# Patient Record
Sex: FEMALE | Race: WHITE | Employment: FULL TIME | URBAN - METROPOLITAN AREA
[De-identification: names, ages, dates, MRNs, and addresses within clinical notes are randomized per-mention and may not be internally consistent; named-entity substitution may affect disease eponyms.]

---

## 2017-07-14 LAB
HPV COMMENT: NORMAL
HPV TYPE 16: NOT DETECTED
HPV TYPE 18: NOT DETECTED
HPVOH (OTHER TYPES): NOT DETECTED

## 2017-11-22 ENCOUNTER — OFFICE VISIT (OUTPATIENT)
Dept: SURGERY | Age: 42
End: 2017-11-22

## 2017-11-22 VITALS
HEART RATE: 73 BPM | BODY MASS INDEX: 21.16 KG/M2 | SYSTOLIC BLOOD PRESSURE: 117 MMHG | TEMPERATURE: 98.2 F | DIASTOLIC BLOOD PRESSURE: 70 MMHG | HEIGHT: 65 IN | WEIGHT: 127 LBS

## 2017-11-22 DIAGNOSIS — N64.4 BREAST PAIN, LEFT: Primary | ICD-10-CM

## 2017-11-22 PROCEDURE — 99203 OFFICE O/P NEW LOW 30 MIN: CPT | Performed by: SURGERY

## 2017-11-22 RX ORDER — M-VIT,TX,IRON,MINS/CALC/FOLIC 27MG-0.4MG
1 TABLET ORAL DAILY
COMMUNITY

## 2017-11-22 RX ORDER — CEPHALEXIN 500 MG/1
500 CAPSULE ORAL
COMMUNITY
Start: 2017-11-14 | End: 2017-11-24

## 2017-11-22 ASSESSMENT — ENCOUNTER SYMPTOMS
RESPIRATORY NEGATIVE: 1
EYES NEGATIVE: 1
GASTROINTESTINAL NEGATIVE: 1

## 2017-11-22 NOTE — PROGRESS NOTES
supplementation  OCP not taking    Medications:  Medication documentation has been reviewed in the electronic medical record and patient office intake form. Exam:  /70 (Site: Right Arm, Position: Sitting, Cuff Size: Medium Adult)   Pulse 73   Temp 98.2 °F (36.8 °C) (Oral)   Ht 5' 5\" (1.651 m)   Wt 127 lb (57.6 kg)   LMP 10/22/2017   BMI 21.13 kg/m²      Physical Exam      Constitutional: She appears well-nourished. No apparent distress. Breast: The patient was examined in the upright and supine position. She has a \"B\" cup breast. Breasts are symmetric. Right: There were no discrete masses or changes in breast contour. There were no skin changes of the breast or nipple areolar complex. There was no nipple inversion or discharge. Left: There is no notable erythema or ballotable fluid collection on physical exam. In the location of her tenderness and there are no significant physical exam findings. Ultrasound performed in office failed to identify any fluid collection over the area of her tenderness. There were no discrete masses or changes in breast contour. There were no skin changes of the breast or nipple areolar complex. There was no nipple inversion or discharge. There is no axillary lymphadenopathy palpated bilaterally. Head: Normocephalic and atraumatic. Eyes: EOM are normal. Pupils are equal, round, and reactive to light. Neck: Neck supple. No tracheal deviation present. Cardiovascular: regular rate. Extremities appear well perfused. Pulmonary: respirations are non-labored and without audible distress  Lymphatics: no palpable axillary or cervical lymphadenopathy. Skin: No rash noted. No erythema. Neurologic: alert and oriented. Assessment/Plan: Ms. Denny Jovel is a 38y.o. year-old woman with left breast pain, acute onset      Left breast pain: Likely mild cellulitis which is improving. There are no concerning physical exam findings at this time.  There is no indication for aspiration or drainage procedure. She was placed on both Bactrim and Keflex. As Bactrim has reasonable coverage and is taken was times per day, we will have her continue on this. We'll plan to see her back in the surgical oncology office in 2-3 weeks to reevaluate. We describe signs and symptoms of worsening which she should notify our office about. She verbalizes understanding. Screening recommendations were reviewed. All of the patient's questions were answered at this time however, she was encouraged to call the office with any further inquiries. Approximately 30 minutes of time were spent in this visit of which 50% or more of the time was related to coordination of care.

## 2018-04-20 ENCOUNTER — HOSPITAL ENCOUNTER (OUTPATIENT)
Dept: SURGERY | Age: 43
Discharge: HOME | End: 2018-04-20
Attending: OBSTETRICS & GYNECOLOGY | Admitting: OBSTETRICS & GYNECOLOGY

## 2018-04-20 VITALS
SYSTOLIC BLOOD PRESSURE: 106 MMHG | BODY MASS INDEX: 20.03 KG/M2 | TEMPERATURE: 97.3 F | WEIGHT: 124.6 LBS | HEART RATE: 67 BPM | DIASTOLIC BLOOD PRESSURE: 63 MMHG | OXYGEN SATURATION: 100 % | HEIGHT: 66 IN | RESPIRATION RATE: 14 BRPM

## 2018-04-20 DIAGNOSIS — N92.0 MENORRHAGIA WITH REGULAR CYCLE: ICD-10-CM

## 2018-04-20 LAB — HCG(URINE) PREGNANCY TEST: NEGATIVE

## 2018-04-20 RX ORDER — LIDOCAINE HYDROCHLORIDE 10 MG/ML
0.5 INJECTION, SOLUTION EPIDURAL; INFILTRATION; INTRACAUDAL; PERINEURAL ONCE
Status: DISCONTINUED | OUTPATIENT
Start: 2018-04-20 | End: 2018-04-21 | Stop reason: HOSPADM

## 2018-04-20 RX ORDER — DIPHENHYDRAMINE HYDROCHLORIDE 50 MG/ML
12.5 INJECTION INTRAMUSCULAR; INTRAVENOUS
Status: ACTIVE | OUTPATIENT
Start: 2018-04-20 | End: 2018-04-20

## 2018-04-20 RX ORDER — ONDANSETRON 2 MG/ML
4 INJECTION INTRAMUSCULAR; INTRAVENOUS
Status: ACTIVE | OUTPATIENT
Start: 2018-04-20 | End: 2018-04-20

## 2018-04-20 RX ORDER — IBUPROFEN 600 MG/1
600 TABLET ORAL EVERY 6 HOURS PRN
Qty: 30 TABLET | Refills: 1 | Status: ON HOLD | OUTPATIENT
Start: 2018-04-20 | End: 2021-11-03 | Stop reason: HOSPADM

## 2018-04-20 RX ORDER — MEPERIDINE HYDROCHLORIDE 25 MG/ML
12.5 INJECTION INTRAMUSCULAR; INTRAVENOUS; SUBCUTANEOUS EVERY 5 MIN PRN
Status: DISCONTINUED | OUTPATIENT
Start: 2018-04-20 | End: 2018-04-21 | Stop reason: HOSPADM

## 2018-04-20 RX ORDER — OXYCODONE HYDROCHLORIDE AND ACETAMINOPHEN 5; 325 MG/1; MG/1
2 TABLET ORAL PRN
Status: ACTIVE | OUTPATIENT
Start: 2018-04-20 | End: 2018-04-20

## 2018-04-20 RX ORDER — OXYCODONE HYDROCHLORIDE AND ACETAMINOPHEN 5; 325 MG/1; MG/1
1 TABLET ORAL PRN
Status: ACTIVE | OUTPATIENT
Start: 2018-04-20 | End: 2018-04-20

## 2018-04-20 RX ORDER — LABETALOL HYDROCHLORIDE 5 MG/ML
5 INJECTION, SOLUTION INTRAVENOUS EVERY 10 MIN PRN
Status: DISCONTINUED | OUTPATIENT
Start: 2018-04-20 | End: 2018-04-21 | Stop reason: HOSPADM

## 2018-04-20 RX ORDER — ACETAMINOPHEN 160 MG/5ML
650 SOLUTION ORAL ONCE
Status: COMPLETED | OUTPATIENT
Start: 2018-04-20 | End: 2018-04-20

## 2018-04-20 RX ORDER — FENTANYL CITRATE 50 UG/ML
25 INJECTION, SOLUTION INTRAMUSCULAR; INTRAVENOUS EVERY 5 MIN PRN
Status: DISCONTINUED | OUTPATIENT
Start: 2018-04-20 | End: 2018-04-21 | Stop reason: HOSPADM

## 2018-04-20 RX ORDER — ACETAMINOPHEN 325 MG/1
TABLET ORAL
Status: DISCONTINUED
Start: 2018-04-20 | End: 2018-04-20 | Stop reason: WASHOUT

## 2018-04-20 RX ORDER — SODIUM CHLORIDE, SODIUM LACTATE, POTASSIUM CHLORIDE, CALCIUM CHLORIDE 600; 310; 30; 20 MG/100ML; MG/100ML; MG/100ML; MG/100ML
INJECTION, SOLUTION INTRAVENOUS CONTINUOUS
Status: DISCONTINUED | OUTPATIENT
Start: 2018-04-20 | End: 2018-04-21 | Stop reason: HOSPADM

## 2018-04-20 RX ORDER — IBUPROFEN 600 MG/1
600 TABLET ORAL EVERY 6 HOURS PRN
Qty: 30 TABLET | Refills: 1 | Status: SHIPPED | OUTPATIENT
Start: 2018-04-20 | End: 2021-10-13

## 2018-04-20 RX ORDER — HYDROMORPHONE HCL 110MG/55ML
0.5 PATIENT CONTROLLED ANALGESIA SYRINGE INTRAVENOUS EVERY 5 MIN PRN
Status: DISCONTINUED | OUTPATIENT
Start: 2018-04-20 | End: 2018-04-21 | Stop reason: HOSPADM

## 2018-04-20 RX ADMIN — FENTANYL CITRATE 25 MCG: 50 INJECTION, SOLUTION INTRAMUSCULAR; INTRAVENOUS at 09:25

## 2018-04-20 RX ADMIN — ACETAMINOPHEN 650 MG: 160 SOLUTION ORAL at 08:05

## 2018-04-20 RX ADMIN — SODIUM CHLORIDE, SODIUM LACTATE, POTASSIUM CHLORIDE, CALCIUM CHLORIDE: 600; 310; 30; 20 INJECTION, SOLUTION INTRAVENOUS at 07:34

## 2018-04-20 ASSESSMENT — PAIN SCALES - GENERAL
PAINLEVEL_OUTOF10: 5
PAINLEVEL_OUTOF10: 2

## 2018-04-20 ASSESSMENT — PAIN - FUNCTIONAL ASSESSMENT: PAIN_FUNCTIONAL_ASSESSMENT: 0-10

## 2021-10-13 RX ORDER — HYDROCODONE BITARTRATE AND ACETAMINOPHEN 5; 325 MG/1; MG/1
1 TABLET ORAL EVERY 6 HOURS PRN
Status: ON HOLD | COMMUNITY
End: 2021-11-03

## 2021-10-13 RX ORDER — LORATADINE 10 MG/1
10 CAPSULE, LIQUID FILLED ORAL DAILY PRN
COMMUNITY

## 2021-10-13 RX ORDER — CALCIUM CARBONATE 500(1250)
500 TABLET ORAL DAILY
COMMUNITY

## 2021-10-13 RX ORDER — MULTIVIT WITH MINERALS/LUTEIN
1000 TABLET ORAL DAILY
COMMUNITY

## 2021-10-13 NOTE — PROGRESS NOTES
Name_______________________________________Printed:____________________  Date and time of surgery__11/3/21_MAIN__0930___________________Arrival Time:__0800______________   1. The instructions given regarding when and if a patient needs to stop oral intake prior to surgery varies. Follow the specific instructions you were given                  ___Nothing to eat or to drink after Midnight the night before. __X__Carbo loading or ERAS instructions will be given to select patients-if you have been given those instructions -please do the following                           The evening before your surgery after dinner before midnight drink 40 ounces of gatorade. If you are diabetic use sugar free. The morning of surgery drink 40 ounces of water. This needs to be finished 3 hours prior to your surgery start time. 2. Take the following pills with a small sip of water on the morning of surgery___________None________________________________________                  Do not take blood pressure medications ending in pril or sartan the saundra prior to surgery or the morning of surgery_   3. Aspirin, Ibuprofen, Advil, Naproxen, Vitamin E and other Anti-inflammatory products and supplements should be stopped for 5 -7days before surgery or as directed by your physician. 4. Check with your Doctor regarding stopping Plavix, Coumadin,Eliquis, Lovenox,Effient,Pradaxa,Xarelto, Fragmin or other blood thinners and follow their instructions. 5. Do not smoke, and do not drink any alcoholic beverages 24 hours prior to surgery. This includes NA Beer. Refrain from the usage of any recreational drugs. 6. You may brush your teeth and gargle the morning of surgery. DO NOT SWALLOW WATER   7. You MUST make arrangements for a responsible adult to stay on site while you are here and take you home after your surgery. You will not be allowed to leave alone or drive yourself home.   It is strongly suggested someone stay with you the first 24 hrs. Your surgery will be cancelled if you do not have a ride home. 8. A parent/legal guardian must accompany a child scheduled for surgery and plan to stay at the hospital until the child is discharged. Please do not bring other children with you. 9. Please wear simple, loose fitting clothing to the hospital.  Uri Angulo not bring valuables (money, credit cards, checkbooks, etc.) Do not wear any makeup (including no eye makeup) or nail polish on your fingers or toes. 10. DO NOT wear any jewelry or piercings on day of surgery. All body piercing jewelry must be removed. 11. If you have ___dentures, they will be removed before going to the OR; we will provide you a container. If you wear ___contact lenses or ___glasses, they will be removed; please bring a case for them. 12. Please see your family doctor/pediatrician for a history & physical and/or concerning medications. Bring any test results/reports from your physician's office. PCP_______surgeon___________Phone___________H&P Appt. Date________             13 If you  have a Living Will and Durable Power of  for Healthcare, please bring in a copy. 15. Notify your Surgeon if you develop any illness between now and surgery  time, cough, cold, fever, sore throat, nausea, vomiting, etc.  Please notify your surgeon if you experience dizziness, shortness of breath or blurred vision between now & the time of your surgery             15. DO NOT shave your operative site 96 hours prior to surgery. For face & neck surgery, men may use an electric razor 48 hours prior to surgery. 16. Shower the night before or morning of surgery using an antibacterial soap or as you have been instructed. 17. To provide excellent care visitors will be limited to one in the room at any given time. 18.  Please bring picture ID and insurance card.              19.  Visit our web site for communication. If the ride is leaving the hospital grounds please make sure they are back in time for pickup. Have the patient inform the staff on arrival what their rides plans are while the patient is in the facility. At the MAIN there is one visitor allowed. Please note that the visitor policy is subject to change. All above information reviewed with patient in person or by phone. Patient verbalizes understanding. All questions and concerns addressed.                                                                                                  Patient/Rep____________________                                                                                                                                    PRE OP INSTRUCTIONS

## 2021-10-28 ENCOUNTER — HOSPITAL ENCOUNTER (OUTPATIENT)
Age: 46
Discharge: HOME | End: 2021-10-28
Payer: COMMERCIAL

## 2021-10-28 DIAGNOSIS — Z01.818 PREOP TESTING: ICD-10-CM

## 2021-10-28 LAB
A/G RATIO: 1.3 (ref 1.1–2.2)
ABO/RH: NORMAL
ALBUMIN SERPL-MCNC: 4.4 G/DL (ref 3.4–5)
ALP BLD-CCNC: 64 U/L (ref 40–129)
ALT SERPL-CCNC: 17 U/L (ref 10–40)
ANION GAP SERPL CALCULATED.3IONS-SCNC: 13 MMOL/L (ref 3–16)
ANTIBODY SCREEN: NORMAL
AST SERPL-CCNC: 16 U/L (ref 15–37)
BILIRUB SERPL-MCNC: 0.3 MG/DL (ref 0–1)
BUN BLDV-MCNC: 13 MG/DL (ref 7–20)
CALCIUM SERPL-MCNC: 9 MG/DL (ref 8.3–10.6)
CHLORIDE BLD-SCNC: 105 MMOL/L (ref 99–110)
CO2: 21 MMOL/L (ref 21–32)
CREAT SERPL-MCNC: 0.8 MG/DL (ref 0.6–1.1)
GFR AFRICAN AMERICAN: >60
GFR NON-AFRICAN AMERICAN: >60
GLUCOSE BLD-MCNC: 109 MG/DL (ref 70–99)
HCT VFR BLD CALC: 38.7 % (ref 36–48)
HEMOGLOBIN: 13.1 G/DL (ref 12–16)
MCH RBC QN AUTO: 31.1 PG (ref 26–34)
MCHC RBC AUTO-ENTMCNC: 33.9 G/DL (ref 31–36)
MCV RBC AUTO: 91.7 FL (ref 80–100)
PDW BLD-RTO: 13 % (ref 12.4–15.4)
PLATELET # BLD: 293 K/UL (ref 135–450)
PMV BLD AUTO: 8.5 FL (ref 5–10.5)
POTASSIUM SERPL-SCNC: 3.8 MMOL/L (ref 3.5–5.1)
RBC # BLD: 4.21 M/UL (ref 4–5.2)
SODIUM BLD-SCNC: 139 MMOL/L (ref 136–145)
TOTAL PROTEIN: 7.7 G/DL (ref 6.4–8.2)
WBC # BLD: 5.6 K/UL (ref 4–11)

## 2021-10-28 PROCEDURE — 85027 COMPLETE CBC AUTOMATED: CPT

## 2021-10-28 PROCEDURE — 86850 RBC ANTIBODY SCREEN: CPT

## 2021-10-28 PROCEDURE — 36415 COLL VENOUS BLD VENIPUNCTURE: CPT

## 2021-10-28 PROCEDURE — U0003 INFECTIOUS AGENT DETECTION BY NUCLEIC ACID (DNA OR RNA); SEVERE ACUTE RESPIRATORY SYNDROME CORONAVIRUS 2 (SARS-COV-2) (CORONAVIRUS DISEASE [COVID-19]), AMPLIFIED PROBE TECHNIQUE, MAKING USE OF HIGH THROUGHPUT TECHNOLOGIES AS DESCRIBED BY CMS-2020-01-R: HCPCS

## 2021-10-28 PROCEDURE — 80053 COMPREHEN METABOLIC PANEL: CPT

## 2021-10-28 PROCEDURE — 86901 BLOOD TYPING SEROLOGIC RH(D): CPT

## 2021-10-28 PROCEDURE — 86900 BLOOD TYPING SEROLOGIC ABO: CPT

## 2021-10-28 PROCEDURE — U0005 INFEC AGEN DETEC AMPLI PROBE: HCPCS

## 2021-10-29 LAB — SARS-COV-2, PCR: NOT DETECTED

## 2021-11-03 ENCOUNTER — HOSPITAL ENCOUNTER (INPATIENT)
Age: 46
LOS: 1 days | Discharge: HOME | DRG: 743 | End: 2021-11-04
Attending: OBSTETRICS & GYNECOLOGY | Admitting: OBSTETRICS & GYNECOLOGY
Payer: COMMERCIAL

## 2021-11-03 ENCOUNTER — ANESTHESIA (OUTPATIENT)
Dept: OPERATING ROOM | Age: 46
DRG: 743 | End: 2021-11-03
Payer: COMMERCIAL

## 2021-11-03 ENCOUNTER — ANESTHESIA EVENT (OUTPATIENT)
Dept: OPERATING ROOM | Age: 46
DRG: 743 | End: 2021-11-03
Payer: COMMERCIAL

## 2021-11-03 VITALS
TEMPERATURE: 97.5 F | RESPIRATION RATE: 2 BRPM | DIASTOLIC BLOOD PRESSURE: 58 MMHG | SYSTOLIC BLOOD PRESSURE: 114 MMHG | OXYGEN SATURATION: 100 %

## 2021-11-03 DIAGNOSIS — Z90.710 S/P VAGINAL HYSTERECTOMY: ICD-10-CM

## 2021-11-03 DIAGNOSIS — Z01.818 PREOP TESTING: Primary | ICD-10-CM

## 2021-11-03 PROBLEM — R10.32 LLQ ABDOMINAL PAIN: Status: ACTIVE | Noted: 2021-11-03

## 2021-11-03 PROBLEM — N85.7 HEMATOMETRA: Status: ACTIVE | Noted: 2021-11-03

## 2021-11-03 LAB
ABO/RH: NORMAL
ANTIBODY SCREEN: NORMAL
HCG(URINE) PREGNANCY TEST: NEGATIVE
HCT VFR BLD CALC: 38.1 % (ref 36–48)
HEMOGLOBIN: 12.7 G/DL (ref 12–16)

## 2021-11-03 PROCEDURE — 3600000004 HC SURGERY LEVEL 4 BASE: Performed by: OBSTETRICS & GYNECOLOGY

## 2021-11-03 PROCEDURE — 36415 COLL VENOUS BLD VENIPUNCTURE: CPT

## 2021-11-03 PROCEDURE — 3700000001 HC ADD 15 MINUTES (ANESTHESIA): Performed by: OBSTETRICS & GYNECOLOGY

## 2021-11-03 PROCEDURE — 6360000002 HC RX W HCPCS: Performed by: FAMILY MEDICINE

## 2021-11-03 PROCEDURE — 2580000003 HC RX 258: Performed by: NURSE ANESTHETIST, CERTIFIED REGISTERED

## 2021-11-03 PROCEDURE — 2500000003 HC RX 250 WO HCPCS: Performed by: NURSE ANESTHETIST, CERTIFIED REGISTERED

## 2021-11-03 PROCEDURE — 84703 CHORIONIC GONADOTROPIN ASSAY: CPT

## 2021-11-03 PROCEDURE — 0UT77ZZ RESECTION OF BILATERAL FALLOPIAN TUBES, VIA NATURAL OR ARTIFICIAL OPENING: ICD-10-PCS | Performed by: OBSTETRICS & GYNECOLOGY

## 2021-11-03 PROCEDURE — 86901 BLOOD TYPING SEROLOGIC RH(D): CPT

## 2021-11-03 PROCEDURE — 3600000005 HC SURGERY LEVEL 5 BASE: Performed by: OBSTETRICS & GYNECOLOGY

## 2021-11-03 PROCEDURE — 3700000000 HC ANESTHESIA ATTENDED CARE: Performed by: OBSTETRICS & GYNECOLOGY

## 2021-11-03 PROCEDURE — 6370000000 HC RX 637 (ALT 250 FOR IP): Performed by: OBSTETRICS & GYNECOLOGY

## 2021-11-03 PROCEDURE — 1220000000 HC SEMI PRIVATE OB R&B

## 2021-11-03 PROCEDURE — 2709999900 HC NON-CHARGEABLE SUPPLY: Performed by: OBSTETRICS & GYNECOLOGY

## 2021-11-03 PROCEDURE — 86900 BLOOD TYPING SEROLOGIC ABO: CPT

## 2021-11-03 PROCEDURE — 88307 TISSUE EXAM BY PATHOLOGIST: CPT

## 2021-11-03 PROCEDURE — 0UT97ZZ RESECTION OF UTERUS, VIA NATURAL OR ARTIFICIAL OPENING: ICD-10-PCS | Performed by: OBSTETRICS & GYNECOLOGY

## 2021-11-03 PROCEDURE — 2500000003 HC RX 250 WO HCPCS: Performed by: OBSTETRICS & GYNECOLOGY

## 2021-11-03 PROCEDURE — 85014 HEMATOCRIT: CPT

## 2021-11-03 PROCEDURE — 6360000002 HC RX W HCPCS: Performed by: NURSE ANESTHETIST, CERTIFIED REGISTERED

## 2021-11-03 PROCEDURE — 7100000000 HC PACU RECOVERY - FIRST 15 MIN: Performed by: OBSTETRICS & GYNECOLOGY

## 2021-11-03 PROCEDURE — 2580000003 HC RX 258: Performed by: FAMILY MEDICINE

## 2021-11-03 PROCEDURE — 6360000002 HC RX W HCPCS: Performed by: OBSTETRICS & GYNECOLOGY

## 2021-11-03 PROCEDURE — 86850 RBC ANTIBODY SCREEN: CPT

## 2021-11-03 PROCEDURE — 2580000003 HC RX 258: Performed by: OBSTETRICS & GYNECOLOGY

## 2021-11-03 PROCEDURE — 3600000015 HC SURGERY LEVEL 5 ADDTL 15MIN: Performed by: OBSTETRICS & GYNECOLOGY

## 2021-11-03 PROCEDURE — 7100000001 HC PACU RECOVERY - ADDTL 15 MIN: Performed by: OBSTETRICS & GYNECOLOGY

## 2021-11-03 PROCEDURE — 3600000014 HC SURGERY LEVEL 4 ADDTL 15MIN: Performed by: OBSTETRICS & GYNECOLOGY

## 2021-11-03 PROCEDURE — 85018 HEMOGLOBIN: CPT

## 2021-11-03 RX ORDER — SODIUM CHLORIDE 0.9 % (FLUSH) 0.9 %
5-40 SYRINGE (ML) INJECTION PRN
Status: DISCONTINUED | OUTPATIENT
Start: 2021-11-03 | End: 2021-11-04 | Stop reason: HOSPADM

## 2021-11-03 RX ORDER — PROPOFOL 10 MG/ML
INJECTION, EMULSION INTRAVENOUS PRN
Status: DISCONTINUED | OUTPATIENT
Start: 2021-11-03 | End: 2021-11-03 | Stop reason: SDUPTHER

## 2021-11-03 RX ORDER — SODIUM CHLORIDE, SODIUM LACTATE, POTASSIUM CHLORIDE, CALCIUM CHLORIDE 600; 310; 30; 20 MG/100ML; MG/100ML; MG/100ML; MG/100ML
INJECTION, SOLUTION INTRAVENOUS CONTINUOUS
Status: DISCONTINUED | OUTPATIENT
Start: 2021-11-03 | End: 2021-11-03

## 2021-11-03 RX ORDER — ONDANSETRON 2 MG/ML
4 INJECTION INTRAMUSCULAR; INTRAVENOUS EVERY 6 HOURS PRN
Status: DISCONTINUED | OUTPATIENT
Start: 2021-11-03 | End: 2021-11-04 | Stop reason: HOSPADM

## 2021-11-03 RX ORDER — DIPHENHYDRAMINE HYDROCHLORIDE 50 MG/ML
12.5 INJECTION INTRAMUSCULAR; INTRAVENOUS
Status: DISCONTINUED | OUTPATIENT
Start: 2021-11-03 | End: 2021-11-03 | Stop reason: HOSPADM

## 2021-11-03 RX ORDER — LABETALOL HYDROCHLORIDE 5 MG/ML
5 INJECTION, SOLUTION INTRAVENOUS EVERY 10 MIN PRN
Status: DISCONTINUED | OUTPATIENT
Start: 2021-11-03 | End: 2021-11-03 | Stop reason: HOSPADM

## 2021-11-03 RX ORDER — KETAMINE HYDROCHLORIDE 10 MG/ML
INJECTION, SOLUTION INTRAMUSCULAR; INTRAVENOUS PRN
Status: DISCONTINUED | OUTPATIENT
Start: 2021-11-03 | End: 2021-11-03 | Stop reason: SDUPTHER

## 2021-11-03 RX ORDER — KETOROLAC TROMETHAMINE 30 MG/ML
INJECTION, SOLUTION INTRAMUSCULAR; INTRAVENOUS PRN
Status: DISCONTINUED | OUTPATIENT
Start: 2021-11-03 | End: 2021-11-03 | Stop reason: SDUPTHER

## 2021-11-03 RX ORDER — OXYCODONE HCL 20 MG/ML
10 CONCENTRATE, ORAL ORAL EVERY 4 HOURS PRN
Status: DISCONTINUED | OUTPATIENT
Start: 2021-11-03 | End: 2021-11-03

## 2021-11-03 RX ORDER — MAGNESIUM HYDROXIDE 1200 MG/15ML
LIQUID ORAL
Status: COMPLETED | OUTPATIENT
Start: 2021-11-03 | End: 2021-11-03

## 2021-11-03 RX ORDER — SODIUM CHLORIDE 0.9 % (FLUSH) 0.9 %
5-40 SYRINGE (ML) INJECTION EVERY 12 HOURS SCHEDULED
Status: DISCONTINUED | OUTPATIENT
Start: 2021-11-03 | End: 2021-11-03 | Stop reason: HOSPADM

## 2021-11-03 RX ORDER — DEXAMETHASONE SODIUM PHOSPHATE 4 MG/ML
INJECTION, SOLUTION INTRA-ARTICULAR; INTRALESIONAL; INTRAMUSCULAR; INTRAVENOUS; SOFT TISSUE PRN
Status: DISCONTINUED | OUTPATIENT
Start: 2021-11-03 | End: 2021-11-03 | Stop reason: SDUPTHER

## 2021-11-03 RX ORDER — OXYCODONE HYDROCHLORIDE 5 MG/1
10 TABLET ORAL PRN
Status: DISCONTINUED | OUTPATIENT
Start: 2021-11-03 | End: 2021-11-03 | Stop reason: HOSPADM

## 2021-11-03 RX ORDER — ONDANSETRON 4 MG/1
4 TABLET, ORALLY DISINTEGRATING ORAL EVERY 8 HOURS PRN
Status: DISCONTINUED | OUTPATIENT
Start: 2021-11-03 | End: 2021-11-04 | Stop reason: HOSPADM

## 2021-11-03 RX ORDER — GLYCOPYRROLATE 0.2 MG/ML
INJECTION INTRAMUSCULAR; INTRAVENOUS PRN
Status: DISCONTINUED | OUTPATIENT
Start: 2021-11-03 | End: 2021-11-03 | Stop reason: SDUPTHER

## 2021-11-03 RX ORDER — OXYCODONE HYDROCHLORIDE 5 MG/1
5 TABLET ORAL EVERY 4 HOURS PRN
Status: DISCONTINUED | OUTPATIENT
Start: 2021-11-03 | End: 2021-11-03

## 2021-11-03 RX ORDER — HYDROMORPHONE HCL 110MG/55ML
0.25 PATIENT CONTROLLED ANALGESIA SYRINGE INTRAVENOUS EVERY 5 MIN PRN
Status: DISCONTINUED | OUTPATIENT
Start: 2021-11-03 | End: 2021-11-03 | Stop reason: HOSPADM

## 2021-11-03 RX ORDER — ACETAMINOPHEN 500 MG
1000 TABLET ORAL ONCE
Status: COMPLETED | OUTPATIENT
Start: 2021-11-03 | End: 2021-11-03

## 2021-11-03 RX ORDER — LIDOCAINE HYDROCHLORIDE 10 MG/ML
0.5 INJECTION, SOLUTION EPIDURAL; INFILTRATION; INTRACAUDAL; PERINEURAL ONCE
Status: DISCONTINUED | OUTPATIENT
Start: 2021-11-03 | End: 2021-11-03 | Stop reason: HOSPADM

## 2021-11-03 RX ORDER — MEPERIDINE HYDROCHLORIDE 25 MG/ML
12.5 INJECTION INTRAMUSCULAR; INTRAVENOUS; SUBCUTANEOUS EVERY 5 MIN PRN
Status: DISCONTINUED | OUTPATIENT
Start: 2021-11-03 | End: 2021-11-03 | Stop reason: HOSPADM

## 2021-11-03 RX ORDER — IBUPROFEN 800 MG/1
800 TABLET ORAL EVERY 8 HOURS PRN
Qty: 40 TABLET | Refills: 1 | Status: SHIPPED | OUTPATIENT
Start: 2021-11-03

## 2021-11-03 RX ORDER — APREPITANT 40 MG/1
40 CAPSULE ORAL ONCE
Status: COMPLETED | OUTPATIENT
Start: 2021-11-03 | End: 2021-11-03

## 2021-11-03 RX ORDER — OXYCODONE HYDROCHLORIDE 5 MG/1
10 TABLET ORAL EVERY 4 HOURS PRN
Status: DISCONTINUED | OUTPATIENT
Start: 2021-11-03 | End: 2021-11-04 | Stop reason: HOSPADM

## 2021-11-03 RX ORDER — ACETAMINOPHEN 500 MG
1000 TABLET ORAL EVERY 8 HOURS
Status: DISCONTINUED | OUTPATIENT
Start: 2021-11-03 | End: 2021-11-04 | Stop reason: HOSPADM

## 2021-11-03 RX ORDER — SODIUM CHLORIDE 9 MG/ML
25 INJECTION, SOLUTION INTRAVENOUS PRN
Status: DISCONTINUED | OUTPATIENT
Start: 2021-11-03 | End: 2021-11-03 | Stop reason: HOSPADM

## 2021-11-03 RX ORDER — ACETAMINOPHEN 500 MG
1000 TABLET ORAL 3 TIMES DAILY
Qty: 60 TABLET | Refills: 1 | COMMUNITY
Start: 2021-11-03

## 2021-11-03 RX ORDER — SUCCINYLCHOLINE CHLORIDE 20 MG/ML
INJECTION INTRAMUSCULAR; INTRAVENOUS PRN
Status: DISCONTINUED | OUTPATIENT
Start: 2021-11-03 | End: 2021-11-03 | Stop reason: SDUPTHER

## 2021-11-03 RX ORDER — SODIUM CHLORIDE 0.9 % (FLUSH) 0.9 %
5-40 SYRINGE (ML) INJECTION EVERY 12 HOURS SCHEDULED
Status: DISCONTINUED | OUTPATIENT
Start: 2021-11-03 | End: 2021-11-04 | Stop reason: HOSPADM

## 2021-11-03 RX ORDER — OXYCODONE HYDROCHLORIDE 5 MG/1
5 TABLET ORAL PRN
Status: DISCONTINUED | OUTPATIENT
Start: 2021-11-03 | End: 2021-11-03 | Stop reason: HOSPADM

## 2021-11-03 RX ORDER — FENTANYL CITRATE 50 UG/ML
50 INJECTION, SOLUTION INTRAMUSCULAR; INTRAVENOUS EVERY 5 MIN PRN
Status: DISCONTINUED | OUTPATIENT
Start: 2021-11-03 | End: 2021-11-03 | Stop reason: HOSPADM

## 2021-11-03 RX ORDER — BUPIVACAINE HYDROCHLORIDE 5 MG/ML
INJECTION, SOLUTION EPIDURAL; INTRACAUDAL
Status: COMPLETED | OUTPATIENT
Start: 2021-11-03 | End: 2021-11-03

## 2021-11-03 RX ORDER — SODIUM CHLORIDE 9 MG/ML
25 INJECTION, SOLUTION INTRAVENOUS PRN
Status: DISCONTINUED | OUTPATIENT
Start: 2021-11-03 | End: 2021-11-04 | Stop reason: HOSPADM

## 2021-11-03 RX ORDER — OXYCODONE HYDROCHLORIDE 5 MG/1
5 TABLET ORAL EVERY 4 HOURS PRN
Status: DISCONTINUED | OUTPATIENT
Start: 2021-11-03 | End: 2021-11-04 | Stop reason: HOSPADM

## 2021-11-03 RX ORDER — SODIUM CHLORIDE, SODIUM LACTATE, POTASSIUM CHLORIDE, CALCIUM CHLORIDE 600; 310; 30; 20 MG/100ML; MG/100ML; MG/100ML; MG/100ML
INJECTION, SOLUTION INTRAVENOUS CONTINUOUS PRN
Status: DISCONTINUED | OUTPATIENT
Start: 2021-11-03 | End: 2021-11-03 | Stop reason: SDUPTHER

## 2021-11-03 RX ORDER — FENTANYL CITRATE 50 UG/ML
INJECTION, SOLUTION INTRAMUSCULAR; INTRAVENOUS PRN
Status: DISCONTINUED | OUTPATIENT
Start: 2021-11-03 | End: 2021-11-03 | Stop reason: SDUPTHER

## 2021-11-03 RX ORDER — MAGNESIUM HYDROXIDE 1200 MG/15ML
LIQUID ORAL CONTINUOUS PRN
Status: COMPLETED | OUTPATIENT
Start: 2021-11-03 | End: 2021-11-03

## 2021-11-03 RX ORDER — LIDOCAINE HYDROCHLORIDE 20 MG/ML
INJECTION, SOLUTION EPIDURAL; INFILTRATION; INTRACAUDAL; PERINEURAL PRN
Status: DISCONTINUED | OUTPATIENT
Start: 2021-11-03 | End: 2021-11-03 | Stop reason: SDUPTHER

## 2021-11-03 RX ORDER — KETOROLAC TROMETHAMINE 30 MG/ML
30 INJECTION, SOLUTION INTRAMUSCULAR; INTRAVENOUS EVERY 6 HOURS
Status: DISCONTINUED | OUTPATIENT
Start: 2021-11-03 | End: 2021-11-04 | Stop reason: HOSPADM

## 2021-11-03 RX ORDER — MAGNESIUM SULFATE HEPTAHYDRATE 500 MG/ML
INJECTION, SOLUTION INTRAMUSCULAR; INTRAVENOUS PRN
Status: DISCONTINUED | OUTPATIENT
Start: 2021-11-03 | End: 2021-11-03 | Stop reason: SDUPTHER

## 2021-11-03 RX ORDER — SODIUM CHLORIDE 0.9 % (FLUSH) 0.9 %
5-40 SYRINGE (ML) INJECTION PRN
Status: DISCONTINUED | OUTPATIENT
Start: 2021-11-03 | End: 2021-11-03 | Stop reason: HOSPADM

## 2021-11-03 RX ORDER — OXYCODONE HYDROCHLORIDE 5 MG/1
5 TABLET ORAL EVERY 6 HOURS PRN
Qty: 28 TABLET | Refills: 0 | Status: SHIPPED | OUTPATIENT
Start: 2021-11-03 | End: 2021-11-08

## 2021-11-03 RX ORDER — MIDAZOLAM HYDROCHLORIDE 1 MG/ML
INJECTION INTRAMUSCULAR; INTRAVENOUS PRN
Status: DISCONTINUED | OUTPATIENT
Start: 2021-11-03 | End: 2021-11-03 | Stop reason: SDUPTHER

## 2021-11-03 RX ORDER — SODIUM CHLORIDE 9 MG/ML
INJECTION, SOLUTION INTRAVENOUS CONTINUOUS
Status: DISCONTINUED | OUTPATIENT
Start: 2021-11-03 | End: 2021-11-04 | Stop reason: HOSPADM

## 2021-11-03 RX ORDER — GABAPENTIN 300 MG/1
300 CAPSULE ORAL ONCE
Status: COMPLETED | OUTPATIENT
Start: 2021-11-03 | End: 2021-11-03

## 2021-11-03 RX ORDER — DEXTROSE, SODIUM CHLORIDE, SODIUM LACTATE, POTASSIUM CHLORIDE, AND CALCIUM CHLORIDE 5; .6; .31; .03; .02 G/100ML; G/100ML; G/100ML; G/100ML; G/100ML
INJECTION, SOLUTION INTRAVENOUS CONTINUOUS
Status: DISCONTINUED | OUTPATIENT
Start: 2021-11-03 | End: 2021-11-04 | Stop reason: HOSPADM

## 2021-11-03 RX ORDER — HYDROMORPHONE HYDROCHLORIDE 1 MG/ML
0.5 INJECTION, SOLUTION INTRAMUSCULAR; INTRAVENOUS; SUBCUTANEOUS
Status: DISCONTINUED | OUTPATIENT
Start: 2021-11-03 | End: 2021-11-04 | Stop reason: HOSPADM

## 2021-11-03 RX ORDER — VASOPRESSIN 20 [USP'U]/ML
INJECTION, SOLUTION INTRAMUSCULAR; SUBCUTANEOUS
Status: COMPLETED | OUTPATIENT
Start: 2021-11-03 | End: 2021-11-03

## 2021-11-03 RX ORDER — HYDROMORPHONE HYDROCHLORIDE 1 MG/ML
0.25 INJECTION, SOLUTION INTRAMUSCULAR; INTRAVENOUS; SUBCUTANEOUS
Status: DISCONTINUED | OUTPATIENT
Start: 2021-11-03 | End: 2021-11-04 | Stop reason: HOSPADM

## 2021-11-03 RX ORDER — ONDANSETRON 2 MG/ML
INJECTION INTRAMUSCULAR; INTRAVENOUS PRN
Status: DISCONTINUED | OUTPATIENT
Start: 2021-11-03 | End: 2021-11-03 | Stop reason: SDUPTHER

## 2021-11-03 RX ORDER — HYDROMORPHONE HCL 110MG/55ML
0.5 PATIENT CONTROLLED ANALGESIA SYRINGE INTRAVENOUS EVERY 5 MIN PRN
Status: DISCONTINUED | OUTPATIENT
Start: 2021-11-03 | End: 2021-11-03 | Stop reason: HOSPADM

## 2021-11-03 RX ORDER — PROMETHAZINE HYDROCHLORIDE 25 MG/ML
6.25 INJECTION, SOLUTION INTRAMUSCULAR; INTRAVENOUS PRN
Status: DISCONTINUED | OUTPATIENT
Start: 2021-11-03 | End: 2021-11-03 | Stop reason: HOSPADM

## 2021-11-03 RX ORDER — VECURONIUM BROMIDE 1 MG/ML
INJECTION, POWDER, LYOPHILIZED, FOR SOLUTION INTRAVENOUS PRN
Status: DISCONTINUED | OUTPATIENT
Start: 2021-11-03 | End: 2021-11-03 | Stop reason: SDUPTHER

## 2021-11-03 RX ORDER — ONDANSETRON 2 MG/ML
4 INJECTION INTRAMUSCULAR; INTRAVENOUS ONCE
Status: DISCONTINUED | OUTPATIENT
Start: 2021-11-03 | End: 2021-11-03 | Stop reason: HOSPADM

## 2021-11-03 RX ADMIN — LIDOCAINE HYDROCHLORIDE 100 MG: 20 INJECTION, SOLUTION EPIDURAL; INFILTRATION; INTRACAUDAL; PERINEURAL at 10:02

## 2021-11-03 RX ADMIN — VECURONIUM BROMIDE 10 MG: 1 INJECTION, POWDER, LYOPHILIZED, FOR SOLUTION INTRAVENOUS at 10:10

## 2021-11-03 RX ADMIN — MAGNESIUM SULFATE HEPTAHYDRATE 1 G: 500 INJECTION, SOLUTION INTRAMUSCULAR; INTRAVENOUS at 10:10

## 2021-11-03 RX ADMIN — KETOROLAC TROMETHAMINE 30 MG: 30 INJECTION, SOLUTION INTRAMUSCULAR; INTRAVENOUS at 11:32

## 2021-11-03 RX ADMIN — SODIUM CHLORIDE, POTASSIUM CHLORIDE, SODIUM LACTATE AND CALCIUM CHLORIDE: 600; 310; 30; 20 INJECTION, SOLUTION INTRAVENOUS at 09:58

## 2021-11-03 RX ADMIN — ACETAMINOPHEN 1000 MG: 500 TABLET ORAL at 16:39

## 2021-11-03 RX ADMIN — KETAMINE HYDROCHLORIDE 10 MG: 10 INJECTION, SOLUTION INTRAMUSCULAR; INTRAVENOUS at 10:59

## 2021-11-03 RX ADMIN — SUGAMMADEX 200 MG: 100 INJECTION, SOLUTION INTRAVENOUS at 11:32

## 2021-11-03 RX ADMIN — FENTANYL CITRATE 50 MCG: 50 INJECTION, SOLUTION INTRAMUSCULAR; INTRAVENOUS at 10:02

## 2021-11-03 RX ADMIN — SUCCINYLCHOLINE CHLORIDE 160 MG: 20 INJECTION, SOLUTION INTRAMUSCULAR; INTRAVENOUS at 10:02

## 2021-11-03 RX ADMIN — ONDANSETRON 4 MG: 2 INJECTION INTRAMUSCULAR; INTRAVENOUS at 10:10

## 2021-11-03 RX ADMIN — KETAMINE HYDROCHLORIDE 10 MG: 10 INJECTION, SOLUTION INTRAMUSCULAR; INTRAVENOUS at 10:27

## 2021-11-03 RX ADMIN — SODIUM CHLORIDE, POTASSIUM CHLORIDE, SODIUM LACTATE AND CALCIUM CHLORIDE: 600; 310; 30; 20 INJECTION, SOLUTION INTRAVENOUS at 08:51

## 2021-11-03 RX ADMIN — APREPITANT 40 MG: 40 CAPSULE ORAL at 08:44

## 2021-11-03 RX ADMIN — SODIUM CHLORIDE: 9 INJECTION, SOLUTION INTRAVENOUS at 16:38

## 2021-11-03 RX ADMIN — KETOROLAC TROMETHAMINE 30 MG: 30 INJECTION, SOLUTION INTRAMUSCULAR; INTRAVENOUS at 18:02

## 2021-11-03 RX ADMIN — HYDROMORPHONE HYDROCHLORIDE 0.5 MG: 2 INJECTION, SOLUTION INTRAMUSCULAR; INTRAVENOUS; SUBCUTANEOUS at 12:20

## 2021-11-03 RX ADMIN — PROPOFOL 200 MG: 10 INJECTION, EMULSION INTRAVENOUS at 10:02

## 2021-11-03 RX ADMIN — GLYCOPYRROLATE 0.4 MG: 0.2 INJECTION, SOLUTION INTRAMUSCULAR; INTRAVENOUS at 10:56

## 2021-11-03 RX ADMIN — KETAMINE HYDROCHLORIDE 10 MG: 10 INJECTION, SOLUTION INTRAMUSCULAR; INTRAVENOUS at 11:07

## 2021-11-03 RX ADMIN — VECURONIUM BROMIDE 2 MG: 1 INJECTION, POWDER, LYOPHILIZED, FOR SOLUTION INTRAVENOUS at 11:20

## 2021-11-03 RX ADMIN — GABAPENTIN 300 MG: 300 CAPSULE ORAL at 08:40

## 2021-11-03 RX ADMIN — MIDAZOLAM 2 MG: 1 INJECTION INTRAMUSCULAR; INTRAVENOUS at 09:53

## 2021-11-03 RX ADMIN — ACETAMINOPHEN 1000 MG: 500 TABLET, FILM COATED ORAL at 08:40

## 2021-11-03 RX ADMIN — PHENYLEPHRINE HYDROCHLORIDE 100 MCG: 10 INJECTION INTRAVENOUS at 10:56

## 2021-11-03 RX ADMIN — CEFAZOLIN 2000 MG: 10 INJECTION, POWDER, FOR SOLUTION INTRAVENOUS at 09:55

## 2021-11-03 RX ADMIN — DEXAMETHASONE SODIUM PHOSPHATE 8 MG: 4 INJECTION, SOLUTION INTRAMUSCULAR; INTRAVENOUS at 10:10

## 2021-11-03 ASSESSMENT — PULMONARY FUNCTION TESTS
PIF_VALUE: 13
PIF_VALUE: 12
PIF_VALUE: 13
PIF_VALUE: 13
PIF_VALUE: 15
PIF_VALUE: 13
PIF_VALUE: 13
PIF_VALUE: 14
PIF_VALUE: 3
PIF_VALUE: 15
PIF_VALUE: 13
PIF_VALUE: 14
PIF_VALUE: 14
PIF_VALUE: 13
PIF_VALUE: 15
PIF_VALUE: 27
PIF_VALUE: 14
PIF_VALUE: 13
PIF_VALUE: 14
PIF_VALUE: 15
PIF_VALUE: 13
PIF_VALUE: 12
PIF_VALUE: 1
PIF_VALUE: 2
PIF_VALUE: 15
PIF_VALUE: 14
PIF_VALUE: 13
PIF_VALUE: 13
PIF_VALUE: 12
PIF_VALUE: 4
PIF_VALUE: 13
PIF_VALUE: 15
PIF_VALUE: 1
PIF_VALUE: 1
PIF_VALUE: 3
PIF_VALUE: 1
PIF_VALUE: 13
PIF_VALUE: 0
PIF_VALUE: 12
PIF_VALUE: 13
PIF_VALUE: 1
PIF_VALUE: 12
PIF_VALUE: 13
PIF_VALUE: 13
PIF_VALUE: 0
PIF_VALUE: 13
PIF_VALUE: 13
PIF_VALUE: 1
PIF_VALUE: 13
PIF_VALUE: 13
PIF_VALUE: 1
PIF_VALUE: 15
PIF_VALUE: 13
PIF_VALUE: 14
PIF_VALUE: 0
PIF_VALUE: 15
PIF_VALUE: 13
PIF_VALUE: 15
PIF_VALUE: 14
PIF_VALUE: 13
PIF_VALUE: 3
PIF_VALUE: 12
PIF_VALUE: 0
PIF_VALUE: 3
PIF_VALUE: 12
PIF_VALUE: 13
PIF_VALUE: 3
PIF_VALUE: 12
PIF_VALUE: 14
PIF_VALUE: 13
PIF_VALUE: 3
PIF_VALUE: 14
PIF_VALUE: 13
PIF_VALUE: 13
PIF_VALUE: 12
PIF_VALUE: 13
PIF_VALUE: 14
PIF_VALUE: 13
PIF_VALUE: 13
PIF_VALUE: 6
PIF_VALUE: 13
PIF_VALUE: 13
PIF_VALUE: 15
PIF_VALUE: 14
PIF_VALUE: 13
PIF_VALUE: 14
PIF_VALUE: 13
PIF_VALUE: 13
PIF_VALUE: 3
PIF_VALUE: 13
PIF_VALUE: 15
PIF_VALUE: 14
PIF_VALUE: 13
PIF_VALUE: 14
PIF_VALUE: 0

## 2021-11-03 ASSESSMENT — PAIN SCALES - GENERAL
PAINLEVEL_OUTOF10: 4
PAINLEVEL_OUTOF10: 2
PAINLEVEL_OUTOF10: 7
PAINLEVEL_OUTOF10: 0

## 2021-11-03 NOTE — PROGRESS NOTES
Discharge prescriptions for motrin and roxicodone given to pt with instructions on use and side effects. See AVS.  Pt verbalized understanding of medications.

## 2021-11-03 NOTE — H&P
Department of Gynecology   Pre-operative History and Physical        DIAGNOSIS:  LLQ abdominal pain, hematometra    PLANNED PROCEDURE:  vaginal hysterectomy    Reason for Admission:  41 yo  s/p endometrial ablation  with a 1 y h/o LLQ pain/ USD showed hematometra. Experiencing vaginal bleeding since  associated with severe pain. History obtained from EMR    Past Medical History:    Past Medical History:   Diagnosis Date    Environmental allergies     H/O folliculitis     SCALP    Migraines     Prolonged emergence from general anesthesia         Past Surgical History:    Past Surgical History:   Procedure Laterality Date    ENDOMETRIAL ABLATION      HYSTEROSCOPY  2018    novasure    TUBAL LIGATION          Medications Prior to Admission:  Medications Prior to Admission: calcium carbonate (OSCAL) 500 MG TABS tablet, Take 500 mg by mouth daily  vitamin E 1000 units capsule, Take 1,000 Units by mouth daily  loratadine (CLARITIN) 10 MG capsule, Take 10 mg by mouth daily as needed  ibuprofen (ADVIL;MOTRIN) 600 MG tablet, Take 1 tablet by mouth every 6 hours as needed for Pain  fluticasone (FLONASE) 50 MCG/ACT nasal spray, 1 spray by Nasal route daily  Multiple Vitamins-Minerals (THERAPEUTIC MULTIVITAMIN-MINERALS) tablet, Take 1 tablet by mouth daily  [DISCONTINUED] HYDROcodone-acetaminophen (NORCO) 5-325 MG per tablet, Take 1 tablet by mouth every 6 hours as needed for Pain. Allergies: Allergies   Allergen Reactions    Erythromycin Hives     increased heart rate.  Phenergan [Promethazine Hcl] Hives     Nausea, vomiting and hives        Social History:   reports that she has never smoked. She has never used smokeless tobacco. She reports that she does not drink alcohol and does not use drugs.      PHYSICAL EXAM:    Patient Vitals for the past 24 hrs:   BP Temp Temp src Pulse Resp SpO2 Weight   21 0827 135/81 96.4 °F (35.8 °C) Temporal 79 18 98 % 145 lb (65.8 kg)      General appearance - alert, well appearing, and in no distress  Mental status - alert, oriented to person, place, and time  Chest - clear to auscultation, no wheezes, rales or rhonchi, symmetric air entry  Heart - normal rate, regular rhythm, normal S1, S2, no murmurs, rubs, clicks or gallops  Skin - normal coloration and turgor, no rashes, no suspicious skin lesions noted     ASSESSMENT AND PLAN:      1. LLQ abdominal pain , hematometra      2. Procedure options, risks and benefits reviewed with patient. Patient expresses understanding.

## 2021-11-03 NOTE — ANESTHESIA PRE PROCEDURE
Department of Anesthesiology  Preprocedure Note       Name:  John Guillen   Age:  42 y.o.  :  1975                                          MRN:  [id number]         Date:  11/3/2021      Surgeon: James Whitman):  Tristen Aldrich MD    Procedure: Procedure(s):  VAGINAL HYSTERECTOMY WITH BILATERAL SALPINGECTOMY    Medications prior to admission:   Prior to Admission medications    Medication Sig Start Date End Date Taking?  Authorizing Provider   calcium carbonate (OSCAL) 500 MG TABS tablet Take 500 mg by mouth daily   Yes Historical Provider, MD   vitamin E 1000 units capsule Take 1,000 Units by mouth daily   Yes Historical Provider, MD   loratadine (CLARITIN) 10 MG capsule Take 10 mg by mouth daily as needed   Yes Historical Provider, MD   ibuprofen (ADVIL;MOTRIN) 600 MG tablet Take 1 tablet by mouth every 6 hours as needed for Pain 18  Yes Keven Park MD   fluticasone (FLONASE) 50 MCG/ACT nasal spray 1 spray by Nasal route daily   Yes Historical Provider, MD   Multiple Vitamins-Minerals (THERAPEUTIC MULTIVITAMIN-MINERALS) tablet Take 1 tablet by mouth daily   Yes Historical Provider, MD       Current medications:    Current Facility-Administered Medications   Medication Dose Route Frequency Provider Last Rate Last Admin    lactated ringers infusion   IntraVENous Continuous Tristen Aldrich MD        lidocaine PF 1 % injection 0.5 mL  0.5 mL IntraDERmal Once Tristen Aldrich MD        ceFAZolin (ANCEF) 2000 mg in dextrose 5 % 50 mL IVPB  2,000 mg IntraVENous On Call to [address] [place] [address], MD        gabapentin (NEURONTIN) capsule 300 mg  300 mg Oral Once Tristen Aldrich MD        acetaminophen (TYLENOL) tablet 1,000 mg  1,000 mg Oral Once Tristen Aldrich MD        meperidine (DEMEROL) injection 12.5 mg  12.5 mg IntraVENous Q5 Min PRN Leeta Lundborg, MD        HYDROmorphone (DILAUDID) injection 0.25 mg  0.25 mg IntraVENous Q5 Min PRN Leeta Lundborg, MD        fentaNYL (SUBLIMAZE) injection 50 mcg  50 mcg IntraVENous Q5 Min PRN Lambert Cristina MD        HYDROmorphone (DILAUDID) injection 0.25 mg  0.25 mg IntraVENous Q5 Min PRN Lambert Cristina MD        HYDROmorphone (DILAUDID) injection 0.5 mg  0.5 mg IntraVENous Q5 Min PRN Lambert Cristina MD        oxyCODONE (ROXICODONE) immediate release tablet 5 mg  5 mg Oral PRN Lambert Cristina MD        Or    oxyCODONE (ROXICODONE) immediate release tablet 10 mg  10 mg Oral PRN Lambert Cristina MD        promethazine [facility]) injection 6.25 mg  6.25 mg IntraVENous PRN Lambert Cristina MD        diphenhydrAMINE (BENADRYL) injection 12.5 mg  12.5 mg IntraVENous Once PRN Lambert Cristina MD        labetalol (NORMODYNE;TRANDATE) injection 5 mg  5 mg IntraVENous Q10 Min PRN Lambert Cristina MD           Allergies: Allergies   Allergen Reactions    Erythromycin Hives     increased heart rate.     Phenergan [Promethazine Hcl] Hives     Nausea, vomiting and hives       Problem List:    Patient Active Problem List   Diagnosis Code    Menorrhagia with regular cycle N92.0       Past Medical History:        Diagnosis Date    Environmental allergies     H/O folliculitis     SCALP    Migraines     Prolonged emergence from general anesthesia        Past Surgical History:        Procedure Laterality Date    ENDOMETRIAL ABLATION      HYSTEROSCOPY  04/20/2018    novasure    TUBAL LIGATION         Social History:    Social History     Tobacco Use    Smoking status: Never Smoker    Smokeless tobacco: Never Used   Substance Use Topics    Alcohol use: Never                                Counseling given: Not Answered      Vital Signs (Current):   Vitals:    10/13/21 1143   Weight: 145 lb (65.8 kg)   Height: 5' 5\" (1.651 m)                                              BP Readings from Last 3 Encounters:   04/20/18 106/63   11/22/17 117/70       NPO Status:                                                                                 BMI:   Wt Readings from Last 3 Encounters:   10/13/21 145 lb (65.8 kg)   04/20/18 124 lb 9.6 oz (56.5 kg)   02/22/18 128 lb (58.1 kg)     Body mass index is 24.13 kg/m². CBC:   Lab Results   Component Value Date    WBC 5.6 10/28/2021    RBC 4.21 10/28/2021    HGB 13.1 10/28/2021    HCT 38.7 10/28/2021    MCV 91.7 10/28/2021    RDW 13.0 10/28/2021     10/28/2021       CMP:   Lab Results   Component Value Date     10/28/2021    K 3.8 10/28/2021     10/28/2021    CO2 21 10/28/2021    BUN 13 10/28/2021    CREATININE 0.8 10/28/2021    GFRAA >60 10/28/2021    GFRAA >60 06/04/2013    AGRATIO 1.3 10/28/2021    LABGLOM >60 10/28/2021    GLUCOSE 109 10/28/2021    PROT 7.7 10/28/2021    CALCIUM 9.0 10/28/2021    BILITOT 0.3 10/28/2021    ALKPHOS 64 10/28/2021    AST 16 10/28/2021    ALT 17 10/28/2021       POC Tests: No results for input(s): POCGLU, POCNA, POCK, POCCL, POCBUN, POCHEMO, POCHCT in the last 72 hours. Coags: No results found for: PROTIME, INR, APTT    HCG (If Applicable):   Lab Results   Component Value Date    PREGTESTUR Negative 04/20/2018        ABGs: No results found for: PHART, PO2ART, UTP4QWQ, JYO2GIZ, BEART, M3AJTRIQ     Type & Screen (If Applicable):  No results found for: LABABO, LABRH    Drug/Infectious Status (If Applicable):  No results found for: HIV, HEPCAB    COVID-19 Screening (If Applicable):   Lab Results   Component Value Date    COVID19 Not Detected 10/28/2021           Anesthesia Evaluation    Airway: Mallampati: II  TM distance: >3 FB   Neck ROM: full  Mouth opening: > = 3 FB Dental:          Pulmonary:                              Cardiovascular:            Rhythm: regular  Rate: normal                    Neuro/Psych:   (+) headaches:,             GI/Hepatic/Renal:             Endo/Other:                     Abdominal:             Vascular: Other Findings:             Anesthesia Plan      general     ASA 2       Induction: intravenous.       Anesthetic plan and risks discussed with patient. Plan discussed with CRNA.                   Wilfrido Yu MD   11/3/2021

## 2021-11-03 NOTE — PROGRESS NOTES
Pt transferred to room [phone] with PACU staff, report received and care assumed at this time. Pt oriented to room and call light. VSS, alert and oriented, assessment WNL, see flowsheet. Pt denies needs at this time. Call light within reach, family at bedside, will continue to monitor.

## 2021-11-03 NOTE — BRIEF OP NOTE
Department of Obstetrics and Gynecology   Brief Operative Report        Pre-operative Diagnosis:  LLQ abdominal pain, Hematometra    Post-operative Diagnosis:  Same    Procedure: Vaginal hysterectomy, bilateral salpingectomies    Surgeon:  Walter Almeida MD     Assistant(s): Danelle Phoenix    Anesthesia:  General    Findings: Bilateral proximal hematosalpinx, Filshie clips present bilaterally, Hematometra, normal ovaires    Estimated blood loss:  75 cc    Specimens: Uterus, bilateral fallopian tubes    Complications:  none    Condition:  stable        See dictated operative report for full details.

## 2021-11-03 NOTE — ANESTHESIA POSTPROCEDURE EVALUATION
Department of Anesthesiology  Postprocedure Note    Patient: Jordyn Foley  MRN: [id number]  [place]: 1975  Date of evaluation: 11/3/2021  Time:  11:52 AM     Procedure Summary     Date: 11/03/21 Room / Location: [address]    Anesthesia Start: 4224 Anesthesia Stop: 1147    Procedure: VAGINAL HYSTERECTOMY WITH BILATERAL SALPINGECTOMY (N/A Vagina ) Diagnosis:       (N85.7 HEMATOMETRA)      (R10.2 PELVIC PAIN)    Surgeons: Yolanda Arias MD Responsible Provider: Ann-Marie Champion MD    Anesthesia Type: general ASA Status: 2          Anesthesia Type: general    Chris Phase I: Chris Score: 8    Chris Phase II:      Last vitals: Reviewed and per EMR flowsheets.        Anesthesia Post Evaluation    Level of consciousness: awake  Complications: no  Multimodal analgesia pain management approach

## 2021-11-03 NOTE — DISCHARGE SUMMARY
Physician Discharge Summary     Patient ID:  Lorraine Starkey  [id number]  89 y.o.  1975    Admit date: 11/3/2021    Discharge date:  11/4/21    Admitting Physician: Suzi Reagan MD    Discharge Diagnoses: LLQ abdominal pain, hematometra  Discharged Condition: STABLE    Procedures Performed: S/P vaginal hysterectomy [Z90.710]    Hospital Course: Patient was admitted on the day of surgery, and underwent an uncomplicated procedure. See dictated op note. Discharge Exam:  Appears well, AVSS, abdomen soft, appropriately tender, nondistended, BS present, incisions clean dry, intact    Disposition: Good    Patient Instructions: Activity: ambulate in house, no lifting or strenuous exercise for 6 weeks, no sex for 8 weeks and no driving while on oxycodone or in pain  Diet: Regular    Wound Care: may shower    Discharge Medication:  [facility] Medication Instructions YLN:[id number]    Printed on:11/03/21 6475   Medication Information                      acetaminophen (TYLENOL) 500 MG tablet  Take 2 tablets by mouth 3 times daily             calcium carbonate (OSCAL) 500 MG TABS tablet  Take 500 mg by mouth daily             fluticasone (FLONASE) 50 MCG/ACT nasal spray  1 spray by Nasal route daily             ibuprofen (ADVIL;MOTRIN) 800 MG tablet  Take 1 tablet by mouth every 8 hours as needed for Pain             loratadine (CLARITIN) 10 MG capsule  Take 10 mg by mouth daily as needed             Multiple Vitamins-Minerals (THERAPEUTIC MULTIVITAMIN-MINERALS) tablet  Take 1 tablet by mouth daily             oxyCODONE (ROXICODONE) 5 MG immediate release tablet  Take 1 tablet by mouth every 6 hours as needed for Pain for up to 5 days. Intended supply: 5 days.  Take lowest dose possible to manage pain             vitamin E 1000 units capsule  Take 1,000 Units by mouth daily                  Follow-up with Suzi Reagan MD in 1-2 weeks    Signed:  Suzi Reagan MD  11/3/2021  11:51 AM

## 2021-11-03 NOTE — PROGRESS NOTES
Pt up to bathroom with RN stand by assist. Pt ambulated with steady gait. Pt able to void 100ml without difficulty. Pt ambulated back to bed without difficulty. Once sitting on the side of the bed pt began to feel slightly dizzy which resolved quickly after lying down. Pt denies needs at this time, resting comfortably in bed, call light within reach.

## 2021-11-04 VITALS
SYSTOLIC BLOOD PRESSURE: 101 MMHG | HEIGHT: 65 IN | HEART RATE: 66 BPM | DIASTOLIC BLOOD PRESSURE: 58 MMHG | TEMPERATURE: 97.5 F | WEIGHT: 145 LBS | RESPIRATION RATE: 16 BRPM | BODY MASS INDEX: 24.16 KG/M2 | OXYGEN SATURATION: 99 %

## 2021-11-04 LAB
BASOPHILS ABSOLUTE: 0 K/UL (ref 0–0.2)
BASOPHILS RELATIVE PERCENT: 0.2 %
EOSINOPHILS ABSOLUTE: 0 K/UL (ref 0–0.6)
EOSINOPHILS RELATIVE PERCENT: 0 %
HCT VFR BLD CALC: 31.2 % (ref 36–48)
HEMOGLOBIN: 10.8 G/DL (ref 12–16)
LYMPHOCYTES ABSOLUTE: 1.1 K/UL (ref 1–5.1)
LYMPHOCYTES RELATIVE PERCENT: 9.7 %
MCH RBC QN AUTO: 31 PG (ref 26–34)
MCHC RBC AUTO-ENTMCNC: 34.7 G/DL (ref 31–36)
MCV RBC AUTO: 89.3 FL (ref 80–100)
MONOCYTES ABSOLUTE: 0.7 K/UL (ref 0–1.3)
MONOCYTES RELATIVE PERCENT: 5.7 %
NEUTROPHILS ABSOLUTE: 9.7 K/UL (ref 1.7–7.7)
NEUTROPHILS RELATIVE PERCENT: 84.4 %
PDW BLD-RTO: 12.6 % (ref 12.4–15.4)
PLATELET # BLD: 295 K/UL (ref 135–450)
PMV BLD AUTO: 7.6 FL (ref 5–10.5)
RBC # BLD: 3.5 M/UL (ref 4–5.2)
WBC # BLD: 11.5 K/UL (ref 4–11)

## 2021-11-04 PROCEDURE — 6360000002 HC RX W HCPCS: Performed by: OBSTETRICS & GYNECOLOGY

## 2021-11-04 PROCEDURE — 2580000003 HC RX 258: Performed by: OBSTETRICS & GYNECOLOGY

## 2021-11-04 PROCEDURE — 85025 COMPLETE CBC W/AUTO DIFF WBC: CPT

## 2021-11-04 PROCEDURE — 6370000000 HC RX 637 (ALT 250 FOR IP): Performed by: OBSTETRICS & GYNECOLOGY

## 2021-11-04 RX ORDER — SIMETHICONE 80 MG
80 TABLET,CHEWABLE ORAL EVERY 6 HOURS PRN
Status: DISCONTINUED | OUTPATIENT
Start: 2021-11-04 | End: 2021-11-04 | Stop reason: HOSPADM

## 2021-11-04 RX ADMIN — ACETAMINOPHEN 1000 MG: 500 TABLET ORAL at 00:04

## 2021-11-04 RX ADMIN — SIMETHICONE 80 MG: 80 TABLET, CHEWABLE ORAL at 04:00

## 2021-11-04 RX ADMIN — ACETAMINOPHEN 1000 MG: 500 TABLET ORAL at 08:17

## 2021-11-04 RX ADMIN — SODIUM CHLORIDE, PRESERVATIVE FREE 10 ML: 5 INJECTION INTRAVENOUS at 05:57

## 2021-11-04 RX ADMIN — SODIUM CHLORIDE, PRESERVATIVE FREE 10 ML: 5 INJECTION INTRAVENOUS at 08:18

## 2021-11-04 RX ADMIN — Medication 10 ML: at 12:01

## 2021-11-04 RX ADMIN — KETOROLAC TROMETHAMINE 30 MG: 30 INJECTION, SOLUTION INTRAMUSCULAR; INTRAVENOUS at 12:00

## 2021-11-04 RX ADMIN — KETOROLAC TROMETHAMINE 30 MG: 30 INJECTION, SOLUTION INTRAMUSCULAR; INTRAVENOUS at 00:04

## 2021-11-04 RX ADMIN — OXYCODONE 5 MG: 5 TABLET ORAL at 05:20

## 2021-11-04 RX ADMIN — KETOROLAC TROMETHAMINE 30 MG: 30 INJECTION, SOLUTION INTRAMUSCULAR; INTRAVENOUS at 05:57

## 2021-11-04 ASSESSMENT — PAIN SCALES - GENERAL
PAINLEVEL_OUTOF10: 5
PAINLEVEL_OUTOF10: 5
PAINLEVEL_OUTOF10: 0
PAINLEVEL_OUTOF10: 6
PAINLEVEL_OUTOF10: 7

## 2021-11-04 NOTE — PROGRESS NOTES
Surgery Post-op note    Post-op Day #1    Subjective:    Ambulating, tolerating PO, denies nausea, voiding, has had flatus, appropriate pain control with meds currently. Notes that muscle spasms has prevented her from sleeping, resolved after flatus this morning. Objective:  Blood pressure 107/65, pulse 75, temperature 97.2 °F (36.2 °C), temperature source Oral, resp. rate 16, height 5' 5\" (1.651 m), weight 145 lb (65.8 kg), SpO2 100 %, not currently breastfeeding. Abdominal exam: soft, mildly tender, nondistended, no masses or organomegaly. Calves: NT, nondematous      Labs:    Recent Labs     11/03/21  1425 11/04/21  0350   WBC  --  11.5*   HGB 12.7 10.8*   HCT 38.1 31.2*   PLT  --  295     No results for input(s): NA, K, CL, CO2, BUN, CREATININE, CALCIUM, AST, ALT in the last 72 hours.     Invalid input(s): CLARK       Current Facility-Administered Medications:     simethicone (MYLICON) chewable tablet 80 mg, 80 mg, Oral, Q6H PRN, Yolanda Arias MD, 80 mg at 11/04/21 0400    0.9 % sodium chloride infusion, , IntraVENous, Continuous, Ann-Marie Champion MD, Paused at 11/03/21 1809    dextrose 5 % in lactated ringers infusion, , IntraVENous, Continuous, Yolanda Arias MD    sodium chloride flush 0.9 % injection 5-40 mL, 5-40 mL, IntraVENous, 2 times per day, Yolanda Arias MD, 10 mL at 11/04/21 0818    sodium chloride flush 0.9 % injection 5-40 mL, 5-40 mL, IntraVENous, PRN, Yolanda Arias MD    0.9 % sodium chloride infusion, 25 mL, IntraVENous, PRN, Yolanda Arias MD    acetaminophen (TYLENOL) tablet 1,000 mg, 1,000 mg, Oral, Q8H, Yloanda Arias MD, 1,000 mg at 11/04/21 0817    HYDROmorphone HCl PF (DILAUDID) injection 0.25 mg, 0.25 mg, IntraVENous, Q3H PRN **OR** HYDROmorphone HCl PF (DILAUDID) injection 0.5 mg, 0.5 mg, IntraVENous, Q3H PRN, Yolanda Arias MD    ketorolac (TORADOL) injection 30 mg, 30 mg, IntraVENous, Q6H, Yolanda Arias MD, 30 mg at 11/04/21 0557   ondansetron (ZOFRAN-ODT) disintegrating tablet 4 mg, 4 mg, Oral, Q8H PRN **OR** ondansetron (ZOFRAN) injection 4 mg, 4 mg, IntraVENous, Q6H PRN, Dorothy Shah MD    oxyCODONE (ROXICODONE) immediate release tablet 5 mg, 5 mg, Oral, Q4H PRN, 5 mg at 11/04/21 0520 **OR** oxyCODONE (ROXICODONE) immediate release tablet 10 mg, 10 mg, Oral, Q4H PRN, Dorothy Shah MD     Assessment/Plan:      Doing well. Routine p.o. Care. Diet: General/Regular    Discharge today: Yes    Follow up: 2 weeks.

## 2021-11-04 NOTE — OP NOTE
[facility] [place] [facility]                     [facility], [facility]                                OPERATIVE REPORT    PATIENT NAME: Sarah Briseno                      :        1975  MED REC NO:   [id number]                          ROOM:         ACCOUNT NO:   [de-identified]                           ADMIT DATE: 2021  PROVIDER:     Regulo Sears MD    DATE OF PROCEDURE:  2021    PREOPERATIVE DIAGNOSES:  Left lower quadrant abdominal pain,  hematometra. POSTOPERATIVE DIAGNOSES:  Left lower quadrant abdominal pain,  hematometra, bilateral hematosalpinx. OPERATION PERFORMED: Vaginal hysterectomy with bilateral salpingectomies    SURGEON:  Regulo Sears MD    ANESTHESIA:  General.    ESTIMATED BLOOD LOSS:  75 mL. INDICATIONS:  This is a 53-year-old white female G5, P3 status post  endometrial ablation in  with a one year history of left lower  quadrant pain, onset of vaginal bleeding in . Her pain is severe and   associated with her bleeding. She is unable to function normally and  has required a visit to the emergency department for severe pain. Ultrasound showed a hematometra. Surgical risks and benefits were discussed and she consented to proceed  with surgery. OPERATIVE PROCEDURE:  She was taken to the operating room, underwent  general anesthesia, was placed in dorsal lithotomy position and prepped  and draped sterilely. Pelvic exam was carried out. She had a  retroverted normal size uterus with some reduced mobility. Castano  catheter was placed. A weighted speculum was placed in the vagina. The  cervix was grasped with a single-tooth tenaculum. The cervicovaginal  junction was infiltrated with 0.5% Marcaine and vasopressin. A  circumferential incision was made at the cervicovaginal junction. The  posterior cul-de-sac was entered sharply.   The anterior cul-de-sac was  not immediately entered, but the overlying vaginal mucosa was sharply  dissected off the cervix. Clamps were placed across the uterosacral and  cardinal ligaments. These were cut and suture ligated using 2-0 Vicryl  suture throughout. Next, the anterior cul-de-sac was entered sharply. The bladder was retracted with a Luis M. Additional clamps were placed  across the uterine vessels, remaining broad ligaments and then finally  the round ligament, fallopian tube, utero-ovarian pedicles and the  uterus was excised. All pedicles were suture ligated with 2-0 Vicryl  suture. The proximal portion of the fallopian tube was  very tense and dilated. Filshie clips were noted on the fallopian tubes. The fallopian tubes  were then excised. Clamps were placed across the mesosalpinx,  Upon  excising the tubes dark red blood was encountered under high pressure in  the proximal portion of the tube consistent with a hamtosalpinx. The entire fallopian tube was  Excised bilaterally. This was all suture ligated with 2-0 Vicryl suture  bilaterally. The ovaries were inspected and found to be normal  bilaterally and these were left in place. Good hemostasis was achieved. The vaginal cuff was then closed with figure-of-eight 2-0 Vicryl  suture interrupted for good hemostasis. She was then awakened and taken  to recovery in stable condition.         Gerda Holland MD    D: 11/03/2021 12:09:39       T: 11/03/2021 23:53:58     [email or web address]  Job#: [id number]     Doc#: [id number]    CC:  Mariia Rodríguez MD

## 2021-11-15 LAB
ESTRADIOL LEVEL: 158 PG/ML
FOLLICLE STIMULATING HORMONE: 5.8 MIU/ML
TSH SERPL DL<=0.05 MIU/L-ACNC: 1.11 UIU/ML (ref 0.27–4.2)

## 2023-07-12 ENCOUNTER — TELEPHONE (OUTPATIENT)
Dept: SURGERY | Age: 48
End: 2023-07-12

## 2023-07-12 NOTE — TELEPHONE ENCOUNTER
Pt is again experiencing same issues as when saw Dr. Thelma Rodriguez in 2017. Pain/swelling has returned. Best phone contact still [phone].

## 2023-07-13 NOTE — TELEPHONE ENCOUNTER
I spoke to patient. Notified her she would need a referral from her PCP to be seen at this office . Since it's been 6 years.       Thanks, Andre Peñaloza

## (undated) DEVICE — MERCY FAIRFIELD TURNOVER KIT: Brand: MEDLINE INDUSTRIES, INC.

## (undated) DEVICE — VAG HYSTERECTOMY-LF: Brand: MEDLINE INDUSTRIES, INC.

## (undated) DEVICE — PAD,ABDOMINAL,8"X10",ST,LF: Brand: MEDLINE

## (undated) DEVICE — LIGHT HANDLE: Brand: DEVON

## (undated) DEVICE — SUTURE VCRL + SZ 0 L18IN ABSRB UD L36MM CT-1 1/2 CIR VCP840D

## (undated) DEVICE — SOLUTION IRRIG 1000ML 0.9% SOD CHL USP POUR PLAS BTL

## (undated) DEVICE — 3 ML SYRINGE LUER-LOCK TIP: Brand: MONOJECT

## (undated) DEVICE — SUTURE VCRL SZ 0 L36IN ABSRB UD CT-1 L36MM 1/2 CIR TAPR PNT VCP946H

## (undated) DEVICE — CATHETER ETER URIN INTMIT NELATON RED RUB 16 FR

## (undated) DEVICE — DECANTER FLD 9IN ST BG FOR ASEP TRNSF OF FLD

## (undated) DEVICE — COVER,MAYO STAND,STERILE: Brand: MEDLINE

## (undated) DEVICE — SHEET,DRAPE,53X77,STERILE: Brand: MEDLINE

## (undated) DEVICE — TOTAL TRAY, DB, 100% SILI FOLEY, 16FR 10: Brand: MEDLINE

## (undated) DEVICE — SYRINGE, LUER LOCK, 10ML: Brand: MEDLINE

## (undated) DEVICE — BASIC SINGLE BASIN 1-LF: Brand: MEDLINE INDUSTRIES, INC.

## (undated) DEVICE — SOLUTION IV IRRIG WATER 1000ML POUR BRL 2F7114

## (undated) DEVICE — GLOVE SURG 7 LTX STRL TRIFLEX LNG FINGER

## (undated) DEVICE — DEVICE SECUREMENT AD W/ TRICOT ANCHR PD FOR F LTX SIL CATH

## (undated) DEVICE — NEEDLE HYPO 22GA L1.5IN BLK POLYPR HUB S STL REG BVL STR

## (undated) DEVICE — BLANKET WRM W29.9XL79.1IN UP BODY FORC AIR MISTRAL-AIR

## (undated) DEVICE — GLOVE SURG SZ 65 THK91MIL LTX FREE SYN POLYISOPRENE

## (undated) DEVICE — GLOVE ORANGE PI 7   MSG9070

## (undated) DEVICE — SPONGE,LAP,4"X18",XR,ST,5/PK,40PK/CS: Brand: MEDLINE INDUSTRIES, INC.

## (undated) DEVICE — BLADE CLIPPER GEN PURP NS

## (undated) DEVICE — TRAY PREP DRY W/ PREM GLV 2 APPL 6 SPNG 2 UNDPD 1 OVERWRAP